# Patient Record
(demographics unavailable — no encounter records)

---

## 2024-11-25 NOTE — HISTORY OF PRESENT ILLNESS
[FreeTextEntry1] : Bernardo is here for the F/U He is at his baseline and is doing well Denies having had any seizures or events suggestive of seizure. He is working full time . getting up around 3 am and going to bed around 7.30 He was admitted to the EMU in late August The study only showed borderline left TC slowing  He was discharged on the same dose of Carbamazepine He did have  a Carbamaz. level done on the 27th that was 11.7 His Vit D level was low= 14 Normal CMP and CBC He did have an MRI done that shows : large left sided encephalomalacia to include BG/thalamus and temporal/frontal and parietal region with slight enlarged ventricles

## 2024-11-25 NOTE — PHYSICAL EXAM
[FreeTextEntry1] : A/A/Ox3 pleasant happy slight difficulty with word finding slight psychomotor slowing + right sided paretic gait + right UE contracture stable gait

## 2024-11-25 NOTE — ASSESSMENT
[FreeTextEntry1] : 1- CP  possible  or early  post- stroke/ blee/  ?? 2- partial epilepsy 3- Vit D deficiency   plan continue current dose F/U discussed the sleep discussed supplement Vit D 1000 unit a day F/U

## 2025-04-21 NOTE — DISCUSSION/SUMMARY
[Risks Associated with Driving/[place] Law] : As per my usual protocol, the patient was advised in regards to risks and driving privileges associated with the [place] Guidelines.  [Safety Recommendations] : The patient was advised in regards to the risk of seizures and general seizure safety recommendations including not to be bathing alone, climbing to high places and operating heavy machinery. [Compliance with Medications] : The importance of compliance with medications was reinforced. [Medication Side Effects] : High frequency and serious potential medication adverse effects were reviewed with the patient, including but not exclusive to psychiatric effects.  Information sheets on medication side effects were made available to the patient in our clinic.  The patient or advocate agrees to notify us for any concerns. [Bone Health Education] : Patient was educated in regards to bone health and an increased risk of osteoporosis in patients with epilepsy. [Sleep Hygiene/Sleep Disruption Risks] : Sleep hygiene and the risks of sleep disruption were discussed. [FreeTextEntry1] : Bernardo Casillas is a 45 year old male with PMH Cerebral Palsy and Seizure Disorder, in office today for follow-up.   Plan:  -Continue Carbamazepine 600mg in AM and 400mg in PM.  -Prescription given for Blood work  -Will admit for VEEG next week to further evaluate Seizure disorder and possibly adjust medication regimen.   -Discussed importance of sleep and sleep pattern.   -Instructed to follow-up with PCP concerning Blood pressure  -Instructed to call if any issues/events before next visit  -Follow-up   Case Discussed with Dr. Dutch Chavez, NP

## 2025-04-21 NOTE — PHYSICAL EXAM
[General Appearance - Alert] : alert [General Appearance - In No Acute Distress] : in no acute distress [Oriented To Time, Place, And Person] : oriented to person, place, and time [Person] : oriented to person [Place] : oriented to place [Time] : oriented to time [Short Term Intact] : short term memory intact [Remote Intact] : remote memory intact [Span Intact] : the attention span was normal [Concentration Intact] : normal concentrating ability [Repeating Phrases] : no difficulty repeating a phrase [Cranial Nerves Optic (II)] : visual acuity intact bilaterally,  visual fields full to confrontation, pupils equal round and reactive to light [Cranial Nerves Oculomotor (III)] : extraocular motion intact [Cranial Nerves Trigeminal (V)] : facial sensation intact symmetrically [Cranial Nerves Facial (VII)] : face symmetrical [Cranial Nerves Vestibulocochlear (VIII)] : hearing was intact bilaterally [Cranial Nerves Accessory (XI - Cranial And Spinal)] : head turning and shoulder shrug symmetric [Cranial Nerves Hypoglossal (XII)] : there was no tongue deviation with protrusion [2+] : Ankle jerk left 2+ [Naming Objects] : no difficulty naming common objects [Motor Handedness Left-Handed] : the patient is left hand dominant [FreeTextEntry1] : appears anxious [Comprehension] : comprehension not intact [Romberg's Sign] : Romberg's sign was negtive [Tremor] : no tremor present [Coordination - Dysmetria Impaired Finger-to-Nose Left Only] : not present on the left side [FreeTextEntry4] : Slight difficulty word finding. (+) slight psychomotor slowing.  [FreeTextEntry6] : (+) contracture of right wrist and fingers [FreeTextEntry8] : stable gait

## 2025-04-21 NOTE — HISTORY OF PRESENT ILLNESS
[FreeTextEntry1] : Bernardo Casillas is a 45 year old male with PMH Cerebral Palsy and Seizure Disorder, in office today for follow-up. Accompanied by his Aunt. His mother on telephone during visit.     On 4/9/25: Bernardo presented to ER complaining of Right am shaking or spasming, stating it is his aura of seizure disorder. He reports taking Carbamazepine as directed, denied missed doses. Last dose taken at 3 AM. Labs taken in ER at 05:37, resulting Carbamazepine level is 13.2 (not trough level). Symptoms improved in ER with no seizure-like activity, so Bernardo was discharged home.    While at work on 4/14/25, Bernardo reports suddenly feeling "an overwhelming anxiety". His right arm spasmed and contracted to chest, (-) LOC, (-) body shaking. Upon EMS arrival, symptoms improved, so no further treatment given, and was not taken to ER.  Bernardo states he has been feeling anxious the last two months, due to fear of having a seizure. He reports having brief episodes of right arm shaking/spasming or demetra to chest, occurring "every other week for two months."   He denies headaches, dizziness, balance issues, falls, visual issues.   He is currently working. Bernardo states he goes to sleep at 7:30PM and wakes at 2 or 3AM for morning commute to work. He denies tiredness and dozing off during the day.   He had URI at end of March.   Blood pressure reading in office is 154/96. Bernardo appeared anxious during this visit. Instructed to follow-up with PCP concerning BP.

## 2025-06-15 NOTE — HISTORY OF PRESENT ILLNESS
[FreeTextEntry1] : Zaki Casillas is a 45 year old male with PMH Cerebral Palsy and Partial Epilepsy, presents in office today for follow up.   Bernardo was admitted to EMU for Video EEG from 4/21/25 - 4/24/25.   VEEG (4/22/25): Abnormal: Borderline generalized slowing. Mild to moderate left hemispheric mainly posterior quadrant/ posterior temporal focal slowing   VEEG (4/23/25): Abnormal: Borderline generalized slowing. Mild to moderate left hemispheric mainly posterior quadrant/ posterior temporal focal slowing   Events - one episode of right arm posturing reported by patient yesterday at 18:45. This event was not associated with EEG change from baseline.   Seizures - none    VEEG (4/24/25): Borderline generalized slowing. Mild to moderate left hemispheric mainly posterior quadrant/ posterior temporal focal slowing.  Events - one episode of right arm posturing reported by patient yesterday at 18:10. This event was not associated with EEG change from baseline.   Seizures - none    Patient was discharged with the following medications:   -Carbamazepine 200 mg oral tablet: 3 tabs in the morning and 2 tabs in the evening (preadmission dose)   -Lexapro 5mg daily x 7 days, then increase to 10 mg daily.    Since discharge, he denies experiencing seizures or seizure-like activity.   He continues to report episodes of right arm spasm with contraction to chest/posturing.   He is taking Carbamazepine as directed. Denies missed doses. Latest Carbamazepine level (4/21/25) is 10.4   Bernardo reports some anxiety related to fear of future seizures, though he states his overall mood has improved since starting Lexapro. He appeared slightly emotional during todays visit.   Bernardo is currently not working, but is actively looking for employment.   He reports sleeping 7-8 hours per night and denies tiredness during the day.

## 2025-06-15 NOTE — DISCUSSION/SUMMARY
[Risks Associated with D[place] Law] : As per my usual protocol, the patient was advised in regards to risks and driving privileges associated with the [place] Guidelines.  [Safety Recommendations] : The patient was advised in regards to the risk of seizures and general seizure safety recommendations including not to be bathing alone, climbing to high places and operating heavy machinery. [Compliance with Medications] : The importance of compliance with medications was reinforced. [Medication Side Effects] : High frequency and serious potential medication adverse effects were reviewed with the patient, including but not exclusive to psychiatric effects.  Information sheets on medication side effects were made available to the patient in our clinic.  The patient or advocate agrees to notify us for any concerns. [Bone Health Education] : Patient was educated in regards to bone health and an increased risk of osteoporosis in patients with epilepsy. [Sleep Hygiene/Sleep Disruption Risks] : Sleep hygiene and the risks of sleep disruption were discussed. [FreeTextEntry1] : Zkai Casillas is a 45 year old male with PMH Cerebral Palsy and Partial Epilepsy, presents in office today for follow up s/p EMU admission for VEEG. Patient denies seizure activity since discharge. Seizures appear well controlled on current treatment regimen. Episodes of right arm spasms with contraction to chest/posturing not consistent with seizure activity per VEEG evaluation. Anxiety improving since initiation of Lexapro.    Plan:  -Continue Carbamazepine 600mg AM / 400mg PM    -Increase Lexapro to 15mg daily  -Prescription given for blood work, to be done in morning before AED dose  -Discussed mood issues. Discussed importance of maintaining daily routine, daily physical activity, and social engagement to support mental health.  -Discussed importance of sleep and maintaining consistent sleep schedule. Recommended 30 minute nap as needed.  -Instructed to call office if any issues/events before next visit  -Follow up   Case Discussed with Dr. Dutch Chavez, NP

## 2025-06-15 NOTE — PHYSICAL EXAM
[General Appearance - Alert] : alert [General Appearance - In No Acute Distress] : in no acute distress [Oriented To Time, Place, And Person] : oriented to person, place, and time [FreeTextEntry1] : slightly emotional  [Person] : oriented to person [Place] : oriented to place [Time] : oriented to time [Short Term Intact] : short term memory intact [Remote Intact] : remote memory intact [Span Intact] : the attention span was normal [Concentration Intact] : normal concentrating ability [Naming Objects] : no difficulty naming common objects [Repeating Phrases] : no difficulty repeating a phrase [Fluency] : fluency intact [Comprehension] : comprehension intact [Cranial Nerves Optic (II)] : visual acuity intact bilaterally,  visual fields full to confrontation, pupils equal round and reactive to light [Cranial Nerves Oculomotor (III)] : extraocular motion intact [Cranial Nerves Trigeminal (V)] : facial sensation intact symmetrically [Cranial Nerves Facial (VII)] : face symmetrical [Cranial Nerves Vestibulocochlear (VIII)] : hearing was intact bilaterally [Cranial Nerves Accessory (XI - Cranial And Spinal)] : head turning and shoulder shrug symmetric [Cranial Nerves Hypoglossal (XII)] : there was no tongue deviation with protrusion [Motor Handedness Left-Handed] : the patient is left hand dominant [Romberg's Sign] : Romberg's sign was negtive [Tremor] : no tremor present [Coordination - Dysmetria Impaired Finger-to-Nose Left Only] : not present on the left side [2+] : Ankle jerk left 2+ [FreeTextEntry4] : slight difficulty with word finding. (+) slight psychomotor slowing. [FreeTextEntry6] : (+) contracture of right wrist and fingers [FreeTextEntry8] : stable gait

## 2025-06-15 NOTE — DISCUSSION/SUMMARY
[Risks Associated with D[place] Law] : As per my usual protocol, the patient was advised in regards to risks and driving privileges associated with the [place] Guidelines.  [Safety Recommendations] : The patient was advised in regards to the risk of seizures and general seizure safety recommendations including not to be bathing alone, climbing to high places and operating heavy machinery. [Compliance with Medications] : The importance of compliance with medications was reinforced. [Medication Side Effects] : High frequency and serious potential medication adverse effects were reviewed with the patient, including but not exclusive to psychiatric effects.  Information sheets on medication side effects were made available to the patient in our clinic.  The patient or advocate agrees to notify us for any concerns. [Bone Health Education] : Patient was educated in regards to bone health and an increased risk of osteoporosis in patients with epilepsy. [Sleep Hygiene/Sleep Disruption Risks] : Sleep hygiene and the risks of sleep disruption were discussed. [FreeTextEntry1] : Zaki Casillas is a 45 year old male with PMH Cerebral Palsy and Partial Epilepsy, presents in office today for follow up s/p EMU admission for VEEG. Patient denies seizure activity since discharge. Seizures appear well controlled on current treatment regimen. Episodes of right arm spasms with contraction to chest/posturing not consistent with seizure activity per VEEG evaluation. Anxiety improving since initiation of Lexapro.    Plan:  -Continue Carbamazepine 600mg AM / 400mg PM    -Increase Lexapro to 15mg daily  -Prescription given for blood work, to be done in morning before AED dose  -Discussed mood issues. Discussed importance of maintaining daily routine, daily physical activity, and social engagement to support mental health.  -Discussed importance of sleep and maintaining consistent sleep schedule. Recommended 30 minute nap as needed.  -Instructed to call office if any issues/events before next visit  -Follow up   Case Discussed with Dr. Dutch Chavez, NP